# Patient Record
Sex: MALE | Race: WHITE
[De-identification: names, ages, dates, MRNs, and addresses within clinical notes are randomized per-mention and may not be internally consistent; named-entity substitution may affect disease eponyms.]

---

## 2018-09-12 ENCOUNTER — HOSPITAL ENCOUNTER (EMERGENCY)
Dept: HOSPITAL 97 - ER | Age: 38
LOS: 1 days | Discharge: HOME | End: 2018-09-13
Payer: COMMERCIAL

## 2018-09-12 DIAGNOSIS — Z71.41: ICD-10-CM

## 2018-09-12 DIAGNOSIS — F10.20: Primary | ICD-10-CM

## 2018-09-12 DIAGNOSIS — E78.00: ICD-10-CM

## 2018-09-12 DIAGNOSIS — E03.9: ICD-10-CM

## 2018-09-12 DIAGNOSIS — M79.601: ICD-10-CM

## 2018-09-12 LAB
ALBUMIN SERPL BCP-MCNC: 4.6 G/DL (ref 3.4–5)
ALP SERPL-CCNC: 115 U/L (ref 45–117)
ALT SERPL W P-5'-P-CCNC: 66 U/L (ref 12–78)
AST SERPL W P-5'-P-CCNC: 212 U/L (ref 15–37)
BUN BLD-MCNC: 13 MG/DL (ref 7–18)
GLUCOSE SERPLBLD-MCNC: 100 MG/DL (ref 74–106)
HCT VFR BLD CALC: 41.5 % (ref 39.6–49)
INR BLD: 1.08
LYMPHOCYTES # SPEC AUTO: 2.2 K/UL (ref 0.7–4.9)
MAGNESIUM SERPL-MCNC: 2.1 MG/DL (ref 1.8–2.4)
MCH RBC QN AUTO: 34.6 PG (ref 27–35)
MCV RBC: 101.9 FL (ref 80–100)
PMV BLD: 8.2 FL (ref 7.6–11.3)
POTASSIUM SERPL-SCNC: 3.1 MMOL/L (ref 3.5–5.1)
RBC # BLD: 4.07 M/UL (ref 4.33–5.43)

## 2018-09-12 PROCEDURE — 96366 THER/PROPH/DIAG IV INF ADDON: CPT

## 2018-09-12 PROCEDURE — 36415 COLL VENOUS BLD VENIPUNCTURE: CPT

## 2018-09-12 PROCEDURE — 80048 BASIC METABOLIC PNL TOTAL CA: CPT

## 2018-09-12 PROCEDURE — 81003 URINALYSIS AUTO W/O SCOPE: CPT

## 2018-09-12 PROCEDURE — 96365 THER/PROPH/DIAG IV INF INIT: CPT

## 2018-09-12 PROCEDURE — 85610 PROTHROMBIN TIME: CPT

## 2018-09-12 PROCEDURE — 83735 ASSAY OF MAGNESIUM: CPT

## 2018-09-12 PROCEDURE — 96375 TX/PRO/DX INJ NEW DRUG ADDON: CPT

## 2018-09-12 PROCEDURE — 80076 HEPATIC FUNCTION PANEL: CPT

## 2018-09-12 PROCEDURE — 82962 GLUCOSE BLOOD TEST: CPT

## 2018-09-12 PROCEDURE — 80307 DRUG TEST PRSMV CHEM ANLYZR: CPT

## 2018-09-12 PROCEDURE — 99284 EMERGENCY DEPT VISIT MOD MDM: CPT

## 2018-09-12 PROCEDURE — 85025 COMPLETE CBC W/AUTO DIFF WBC: CPT

## 2018-09-12 PROCEDURE — 93005 ELECTROCARDIOGRAM TRACING: CPT

## 2018-09-12 PROCEDURE — 80320 DRUG SCREEN QUANTALCOHOLS: CPT

## 2018-09-12 NOTE — EKG
Test Date:    2018-09-12               Test Time:    16:11:57

Technician:   CHUNG                                     

                                                     

MEASUREMENT RESULTS:                                       

Intervals:                                           

Rate:         96                                     

HI:           144                                    

QRSD:         98                                     

QT:           376                                    

QTc:          475                                    

Axis:                                                

P:            65                                     

HI:           144                                    

QRS:          -19                                    

T:            29                                     

                                                     

INTERPRETIVE STATEMENTS:                                       

                                                     

Normal sinus rhythm with sinus arrhythmia

Normal ECG

No previous ECG available for comparison



Electronically Signed On 09-12-18 19:09:40 CDT by Arie Schaefer

## 2018-09-12 NOTE — RAD REPORT
EXAM DESCRIPTION:  RAD - Humerus Right - 9/12/2018 5:22 pm

 

CLINICAL HISTORY:  PAIN

 

COMPARISON:  No comparisons

 

FINDINGS:  No fracture or dislocation is seen.

## 2018-09-13 LAB
METHAMPHET UR QL SCN: NEGATIVE
THC SERPL-MCNC: NEGATIVE NG/ML

## 2018-09-13 NOTE — EDPHYS
Physician Documentation                                                                           

 [facility]                                                                

Name: Javier Ryan                                                                                

Age: 38 yrs                                                                                       

Sex: Male                                                                                         

: 1980                                                                                   

MRN: [id number]                                                                                   

Arrival Date: 2018                                                                          

Time: 16:08                                                                                       

Account#: [id number]                                                                            

Bed 5                                                                                             

Private MD:                                                                                       

ED Physician Rosario Andrews                                                                    

HPI:                                                                                              

                                                                                             

19:40 This 38 yrs old  Male presents to ER via EMS with complaints of Alcohol        jr8 

      Withdrawal.                                                                                 

19:40 Patient came via EMS for alcohol withdrawal. Stated that last drink was early this      jr8 

      morning. Has on/off twitching. Fell about 5 days ago and hurt his arm. Wants to make        

      sure he did not break anything . Severity of symptoms: At their worst the symptoms were     

      mild in the emergency department the symptoms are unchanged. The patient has                

      experienced similar episodes in the past, chronically. The patient has not recently         

      seen a physician.                                                                           

                                                                                                  

Historical:                                                                                       

- Allergies:                                                                                      

16:11 No Known Allergies;                                                                     ss  

- Home Meds:                                                                                      

16:11 gemfibrozil 600 mg Oral tab 1 tab 2 times per day [Active]; Nature-Throid 97.5 mg Oral  ss  

      tab [Active];                                                                               

- PMHx:                                                                                           

16:11 High Cholesterol; Hypothyroidism; Pancreatitis;                                         ss  

- PSHx:                                                                                           

16:11 None;                                                                                   ss  

                                                                                                  

- Immunization history:: Adult Immunizations up to date.                                          

- Social history:: Smoking status: Patient/guardian denies using tobacco.                         

- Ebola Screening: : Patient denies exposure to infectious person Patient denies travel           

  to an Ebola-affected area in the 21 days before illness onset.                                  

                                                                                                  

                                                                                                  

ROS:                                                                                              

19:40 Eyes: Negative for injury, pain, redness, and discharge, ENT: Negative for injury,      jr8 

      pain, and discharge, Neck: Negative for injury, pain, and swelling, Cardiovascular:         

      Negative for chest pain, palpitations, and edema, Respiratory: Negative for shortness       

      of breath, cough, wheezing, and pleuritic chest pain, Abdomen/GI: Negative for              

      abdominal pain, nausea, vomiting, diarrhea, and constipation, Back: Negative for injury     

      and pain, Skin: Negative for injury, rash, and discoloration, Neuro: Negative for           

      headache, weakness, numbness, tingling, and seizure.                                        

19:40 MS/extremity: Positive for ecchymosis, pain, tenderness, of the right arm.                  

19:40 Psych: Positive for alcohol dependence.                                                     

                                                                                                  

Exam:                                                                                             

19:40 Eyes:  Pupils equal round and reactive to light, extra-ocular motions intact.  Lids and jr8 

      lashes normal.  Conjunctiva and sclera are non-icteric and not injected.  Cornea within     

      normal limits.  Periorbital areas with no swelling, redness, or edema. ENT:  Nares          

      patent. No nasal discharge, no septal abnormalities noted.  Tympanic membranes are          

      normal and external auditory canals are clear.  Oropharynx with no redness, swelling,       

      or masses, exudates, or evidence of obstruction, uvula midline.  Mucous membranes           

      moist. Neck:  Trachea midline, no thyromegaly or masses palpated, and no cervical           

      lymphadenopathy.  Supple, full range of motion without nuchal rigidity, or vertebral        

      point tenderness.  No Meningismus. Cardiovascular:  Regular rate and rhythm with a          

      normal S1 and S2.  No gallops, murmurs, or rubs.  Normal PMI, no JVD.  No pulse             

      deficits. Respiratory:  Lungs have equal breath sounds bilaterally, clear to                

      auscultation and percussion.  No rales, rhonchi or wheezes noted.  No increased work of     

      breathing, no retractions or nasal flaring. Abdomen/GI:  Soft, non-tender, with normal      

      bowel sounds.  No distension or tympany.  No guarding or rebound.  No evidence of           

      tenderness throughout. Skin:  Warm, dry with normal turgor.  Normal color with no           

      rashes, no lesions, and no evidence of cellulitis. Neuro:  Awake and alert, GCS 15,         

      oriented to person, place, time, and situation.  Cranial nerves II-XII grossly intact.      

      Motor strength 5/5 in all extremities.  Sensory grossly intact.  Cerebellar exam            

      normal.  Normal gait.                                                                       

19:40 Back: pain, is absent, ROM is normal, bruising to right lateral lower abdomen .             

19:40 Musculoskeletal/extremity: Extremities: grossly normal except: noted in the right arm:      

      bruising and hematoma to right arm. Tender to palpation, ROM: intact in all                 

      extremities, Circulation is intact in all extremities. Pulses: noted to be 2+ in the        

      right radial artery and left radial artery, Sensation intact.                               

                                                                                                  

Vital Signs:                                                                                      

16:11  / 100; Pulse 112; Resp 18; Temp 97.8(TE); Pulse Ox 98% on R/A; Weight 70.31 kg;  ss  

      Height 5 ft. 6 in. (167.64 cm); Pain 6/10;                                                  

17:40  / 97; Pulse 90; Resp 17; Pulse Ox 98% on R/A; Pain 6/10;                         sg  

18:38  / 95; Pulse 103; Resp 15; Pulse Ox 100% on R/A;                                  hb  

19:15  / 88; Pulse 110; Resp 20; Pulse Ox 95% on R/A; Pain 4/10;                        fc  

20:07  / 89; Pulse 97; Resp 20; Pulse Ox 97% on R/A; Pain 2/10;                         fc  

21:00  / 94; Pulse 103; Resp 20; Pulse Ox 98% on R/A;                                   fc  

21:50  / 94; Pulse 100; Resp 20; Pulse Ox 97% on R/A; Pain 3/10;                        fc  

23:00  / 89; Pulse 110; Resp 20; Pulse Ox 99% on R/A;                                   fc  

                                                                                             

00:00  / 86; Pulse 106; Resp 20; Pulse Ox 95% on R/A; Pain 3/10;                        fc  

01:00  / 81; Pulse 98; Resp 22; Pulse Ox 96% on R/A; Pain 2/10;                         fc  

02:00  / 77; Pulse 89; Resp 20; Temp 98.0(O); Pulse Ox 96% on R/A; Pain 2/10;           fc  

03:13  / 97; Pulse 92; Resp 24; Pulse Ox 94% on R/A;                                    ak1 

                                                                                             

16:11 Body Mass Index 25.02 (70.31 kg, 167.64 cm)                                             ss  

                                                                                                  

MDM:                                                                                              

                                                                                             

16:13 Patient medically screened.                                                             jr8 

21:08 Data reviewed: vital signs, nurses notes, lab test result(s), EKG, and as a result, I   jr8 

      will discharge patient. Data interpreted: Pulse oximetry: on room air is 97 %.              

      Interpretation: normal. Counseling: I had a detailed discussion with the patient and/or     

      guardian regarding: the historical points, exam findings, and any diagnostic results        

      supporting the discharge/admit diagnosis, lab results, the need for outpatient follow       

      up, a family practitioner, to return to the emergency department if symptoms worsen or      

      persist or if there are any questions or concerns that arise at home. ED course:            

      Discussed with patient that he needs to check into detox center. No acute withdrawal at     

      this time. Patient is good with this and will follow up. Patient wanting to quite           

      completely.                                                                                 

21:39 ED course: Patient without ride. Will hold until he finds one or becomes sober .        [facility] 

                                                                                                  

                                                                                             

16:20 Order name: Glucose, Ancillary Testing; Complete Time: 16:50                            EDMS

                                                                                             

16:22 Order name: Basic Metabolic Panel; Complete Time: 17:11                                 [facility] 

                                                                                             

16:22 Order name: CBC with Diff; Complete Time: 17:11                                         [facility] 

                                                                                             

16:22 Order name: ETOH Level; Complete Time: 17:11                                            [facility] 

                                                                                             

16:22 Order name: Hepatic Function; Complete Time: 17:11                                      [facility] 

                                                                                             

16:22 Order name: PT-INR; Complete Time: 17:11                                                [facility] 

                                                                                             

16:22 Order name: Urine Drug Screen; Complete Time: 02:08                                     [facility] 

                                                                                             

16:22 Order name: Magnesium; Complete Time: 17:11                                             [facility] 

                                                                                             

16:23 Order name: XRAY Humerus RIGHT; Complete Time: 17:45                                    [facility] 

                                                                                             

01:47 Order name: Urine Dipstick--Ancillary (enter results); Complete Time: 01:52             mt  

                                                                                             

16:22 Order name: EKG; Complete Time: 16:23                                                   [facility] 

                                                                                             

16:22 Order name: EKG - Nurse/Tech; Complete Time: 16:35                                      [facility] 

                                                                                             

16:22 Order name: IV Saline Lock; Complete Time: 16:35                                        [facility] 

                                                                                             

16:22 Order name: Labs collected and sent; Complete Time: 16:36                               [facility] 

                                                                                             

16:22 Order name: Urine Dipstick-Ancillary (obtain specimen); Complete Time: 01:52            [facility] 

                                                                                             

17:42 Order name: Diet Regular; Complete Time: 17:42                                          sg  

                                                                                                  

Administered Medications:                                                                         

16:50 Drug: NS 0.9% 1000 ml Route: IV; Rate: 1000 ml; Site: left antecubital;                 sg  

19:39 Follow up: Response: No adverse reaction; No change in condition; IV Status: Completed  fc  

      infusion; IV Intake: 1000ml                                                                 

16:54 Drug: Ativan 1 mg Route: IVP; Site: left antecubital;                                   sg  

17:58 Follow up: Response: No adverse reaction                                                sg  

16:55 Drug: Banana Bag - (NS 0.9% 1000 ml, foLIC Acid 1 mg, Thiamine 100 mg, Multivitamin 1   sg  

      amp) Route: IV; Rate: calculated rate; Site: left antecubital;                              

21:00 Follow up: Response: No adverse reaction; No change in condition; IV Status: Completed    

      infusion; IV Intake: 1000ml                                                                 

18:39 Drug: Potassium Chloride 40 mEq Route: PO;                                              hb  

19:39 Follow up: Response: No adverse reaction; No change in condition                          

21:30 Drug: Librium - chlordiazePOXIDE 50 mg Route: PO;                                         

22:30 Follow up: Response: No adverse reaction; Anxiety decreased                               

21:47 Drug: Ativan 2 mg Route: IVP; Site: left antecubital;                                     

22:30 Follow up: Response: No adverse reaction; Anxiety decreased                               

21:48 Drug: NS 0.9% 1000 ml Route: IV; Rate: 100 ml/hr; Site: left antecubital;                 

                                                                                             

01:58 Drug: Ativan 2 mg Route: IVP; Site: left antecubital;                                     

                                                                                                  

                                                                                                  

Disposition:                                                                                      

18 03:21 Discharged to Home. Impression: Alcohol abuse, Alcohol abuse counseling and        

  surveillance.                                                                                   

- Condition is Stable.                                                                            

- Discharge Instructions: Alcohol Intoxication.                                                   

                                                                                                  

- Medication Reconciliation Form, Thank You Letter, Antibiotic Education, Prescription            

  Opioid Use form.                                                                                

- Follow up: Private Physician; When: 2 - 3 days; Reason: Recheck today's complaints,             

  Continuance of care, Re-evaluation by your physician.                                           

- Problem is new.                                                                                 

- Symptoms have improved.                                                                         

                                                                                                  

                                                                                                  

                                                                                                  

Addendum:                                                                                         

2018                                                                                        

     18:21 Co-signature as Attending Physician, Rosario Andrews MD.                               m
a2

                                                                                                  

Signatures:                                                                                       

Dispatcher [employer]                           EDMS                                                 

Morgan Santos RN RN sg Chretien, Felicia, RN RN                                                      

Karen Harper RN RN ss Roszak, Josh, PA                        PA   jr8                                                  

Rowena Avina RN RN hb Thompson, Moriah mt Alzahri, Mohammad, MD MD   ma2                                                  

                                                                                                  

Corrections: (The following items were deleted from the chart)                                    

                                                                                             

21:13 21:08 ED course: Discussed with patient that he needs to check into detox center. No    jr8 

      acute withdrawal at this time. Patient is good with this and will follow up . jr8           

21:39 21:09 2018 21:09 Discharged to Home. Impression: Alcohol abuse; Alcohol abuse     jr8 

      counseling and surveillance. Condition is Stable. Forms are Medication Reconciliation       

      Form, Thank You Letter, Antibiotic Education, Prescription Opioid Use. Follow up:           

      Private Physician; When: 2 - 3 days; Reason: Recheck today's complaints, Continuance of     

      care, Re-evaluation by your physician. Problem is new. Symptoms have improved. jr8          

                                                                                             

07:29 03:21 2018 03:21 Discharged to Home. Impression: Alcohol abuse; Alcohol abuse     mt  

      counseling and surveillance. Condition is Stable. Forms are Medication Reconciliation       

      Form, Thank You Letter, Antibiotic Education, Prescription Opioid Use. Follow up:           

      Private Physician; When: 2 - 3 days; Reason: Recheck today's complaints, Continuance of     

      care, Re-evaluation by your physician. Problem is new. Symptoms have improved. jr8          

                                                                                                  

**************************************************************************************************

## 2018-09-13 NOTE — ER
Nurse's Notes                                                                                     

 [facility]                                                                

Name: Javier Ryan                                                                                

Age: 38 yrs                                                                                       

Sex: Male                                                                                         

: 1980                                                                                   

MRN: [id number]                                                                                   

Arrival Date: 2018                                                                          

Time: 16:08                                                                                       

Account#: [id number]                                                                            

Bed 5                                                                                             

Private MD:                                                                                       

Diagnosis: Alcohol abuse;Alcohol abuse counseling and surveillance                                

                                                                                                  

Presentation:                                                                                     

                                                                                             

16:09 Presenting complaint: EMS states: Pt is withdrawing from ETOH. Last drink was this AM   ss  

      at 0200, and patient attempted to go to rehab facility in [place], but was turned away     

      and told to come to ER for initial evaluation. Transition of care: patient was not          

      received from another setting of care. Onset of symptoms was 2018. Risk       

      Assessment: Do you want to hurt yourself or someone else? Patient reports no desire to      

      harm self or others. Initial Sepsis Screen: Does the patient meet any 2 criteria? HR >      

      90 bpm. Does the patient have a suspected source of infection? No. Patient's initial        

      sepsis screen is negative. Care prior to arrival: None.                                     

16:09 Method Of Arrival: EMS: [facility]                                                       ss  

16:09 Acuity: ROSANNE 2                                                                           ss  

                                                                                                  

Historical:                                                                                       

- Allergies:                                                                                      

16:11 No Known Allergies;                                                                     ss  

- Home Meds:                                                                                      

16:11 gemfibrozil 600 mg Oral tab 1 tab 2 times per day [Active]; Nature-Throid 97.5 mg Oral  ss  

      tab [Active];                                                                               

- PMHx:                                                                                           

16:11 High Cholesterol; Hypothyroidism; Pancreatitis;                                         ss  

- PSHx:                                                                                           

16:11 None;                                                                                   ss  

                                                                                                  

- Immunization history:: Adult Immunizations up to date.                                          

- Social history:: Smoking status: Patient/guardian denies using tobacco.                         

- Ebola Screening: : Patient denies exposure to infectious person Patient denies travel           

  to an Ebola-affected area in the 21 days before illness onset.                                  

                                                                                                  

                                                                                                  

Screenin:00 Abuse screen: Denies threats or abuse. Denies injuries from another. Nutritional        hb  

      screening: No deficits noted. Tuberculosis screening: No symptoms or risk factors           

      identified. Fall Risk Total Vivar Fall Scale indicates High Risk Score (45 or more          

      points). Fall prevention measures have been instituted. Side Rails Up X 2 Frequent          

      Obs/Assessments Occuring As available patient and family educated on Fall Prevention        

      Program and Strategies.                                                                     

                                                                                                  

Assessment:                                                                                       

16:15 General: Appears in no apparent distress. Behavior is cooperative, restless, Smells of  hb  

      alcohol. Pain: Denies pain. Neuro: Level of Consciousness is obeys commands, lethargic,     

      Oriented to person, place, time, situation, Pupils are PERRLA. Cardiovascular: Heart        

      tones S1 S2 present Capillary refill < 3 seconds Patient's skin is warm and dry.            

      Respiratory: Airway is patent Respiratory effort is even, unlabored, Respiratory            

      pattern is regular, symmetrical, Breath sounds are clear bilaterally. GI: No signs          

      and/or symptoms were reported involving the gastrointestinal system. : No signs           

      and/or symptoms were reported regarding the genitourinary system. EENT: No signs and/or     

      symptoms were reported regarding the EENT system. Derm: Skin is intact, is healthy with     

      good turgor, bruising noted to right arm and right hip. Musculoskeletal: No signs           

      and/or symptoms reported regarding the musculoskeletal system.                              

17:15 Reassessment: Patient appears in no apparent distress at this time. No changes from       

      previously documented assessment. Patient and/or family updated on plan of care and         

      expected duration. Pain level reassessed.                                                   

18:15 Reassessment: Patient appears in no apparent distress at this time. No changes from       

      previously documented assessment. Patient and/or family updated on plan of care and         

      expected duration. Pain level reassessed. Admission ordered, awaiting room assignment       

      at this time.                                                                               

19:15 General: Appears in no apparent distress. comfortable, Behavior is calm, cooperative,   fc  

      appropriate for age, Smells of alcohol. Pain: Complains of pain in right arm. Neuro:        

      Level of Consciousness is awake, alert, obeys commands, Oriented to person, place,          

      time, situation,  are equal bilaterally Moves all extremities. Full function           

      Speech is normal, Facial symmetry appears normal. Cardiovascular: Heart tones S1 S2         

      Capillary refill < 3 seconds Patient's skin is warm and dry. Respiratory: Airway is         

      patent Respiratory effort is even, unlabored, Respiratory pattern is regular,               

      symmetrical, Breath sounds are clear bilaterally. GI: No deficits noted. : No             

      deficits noted. EENT: No deficits noted. Derm: Skin is pink, warm \T\ dry. Bruising that    

      is dark purple, green, yellow, on right arm. Musculoskeletal: Circulation, motion, and      

      sensation intact. Capillary refill < 3 seconds, Range of motion: intact in all              

      extremities.                                                                                

20:06 Reassessment: No changes from previously documented assessment. Patient and/or family   fc  

      updated on plan of care and expected duration. Pain level reassessed. Patient is alert,     

      oriented x 3, equal unlabored respirations, skin warm/dry/pink. Chano HENLEY in to discuss       

      discharge with pt once fluids are complete.                                                 

20:58 Reassessment: No changes from previously documented assessment. Patient and/or family   fc  

      updated on plan of care and expected duration. Pain level reassessed. Patient is alert,     

      oriented x 3, equal unlabored respirations, skin warm/dry/pink. Pt continues to await       

      fluid completion. No change in overall condition.                                           

21:35 Reassessment: Pt continues to shake on his right arm. States that he cannot control it. fc  

      Discussed with Chano HENLEY about that and that pt has no ride home at this time. He ordered     

      Ativan and NS and will hold discharge until ride is available.                              

22:31 Reassessment: shaking to right arm has stopped.                                         fc  

22:58 Reassessment: No changes from previously documented assessment. Patient and/or family   fc  

      updated on plan of care and expected duration. Pain level reassessed. Patient is alert,     

      oriented x 3, equal unlabored respirations, skin warm/dry/pink. Pt requesting to get up     

      and walk around. Explained that he was just given ATivan and that he could not get up       

      and needed to just rest at this time. Pt states that he understands.                        

23:19 Reassessment: Pt given sandwich to eat and is now requesting sleeping pill. Explained   fc  

      that we would discuss it with provider.                                                     

                                                                                             

00:00 Reassessment: No changes from previously documented assessment. Patient and/or family   fc  

      updated on plan of care and expected duration. Pain level reassessed. Patient is alert,     

      oriented x 3, equal unlabored respirations, skin warm/dry/pink. Pt is resting quietly       

      in bed.                                                                                     

01:00 Reassessment: No changes from previously documented assessment. Patient and/or family   fc  

      updated on plan of care and expected duration. Pain level reassessed. Patient is alert,     

      oriented x 3, equal unlabored respirations, skin warm/dry/pink. Pt pending movement to      

      another room so that he can rest in a quieter place.                                        

01:46 Reassessment: No changes from previously documented assessment. Patient and/or family   fc  

      updated on plan of care and expected duration. Pain level reassessed. Patient is alert,     

      oriented x 3, equal unlabored respirations, skin warm/dry/pink. Pt moved to ER bed 5        

      and is requesting Ativan. Will discuss with provider again.                                 

01:51 Reassessment: After speaking with Chano HENLEY pt will get Ativan 2 mg ivp x 1.                

03:13 Reassessment: Patient appears in no apparent distress at this time. pt used bedside     ak1 

      commode and is back on stretcher and resting. will continue to monitor.                     

06:37 Reassessment: pt ambulated with slightly unsteady gait, drank some water, is attempting bb  

      to call for a ride home.                                                                    

06:49 Reassessment: pt states he has called his neighbor who will come pick him up after she  bb  

      drops her daughter off at school in [facility].                                               

                                                                                                  

Vital Signs:                                                                                      

                                                                                             

16:11  / 100; Pulse 112; Resp 18; Temp 97.8(TE); Pulse Ox 98% on R/A; Weight 70.31 kg;  ss  

      Height 5 ft. 6 in. (167.64 cm); Pain 6/10;                                                  

17:40  / 97; Pulse 90; Resp 17; Pulse Ox 98% on R/A; Pain 6/10;                         sg  

18:38  / 95; Pulse 103; Resp 15; Pulse Ox 100% on R/A;                                  hb  

19:15  / 88; Pulse 110; Resp 20; Pulse Ox 95% on R/A; Pain 4/10;                        fc  

20:07  / 89; Pulse 97; Resp 20; Pulse Ox 97% on R/A; Pain 2/10;                         fc  

21:00  / 94; Pulse 103; Resp 20; Pulse Ox 98% on R/A;                                   fc  

21:50  / 94; Pulse 100; Resp 20; Pulse Ox 97% on R/A; Pain 3/10;                        fc  

23:00  / 89; Pulse 110; Resp 20; Pulse Ox 99% on R/A;                                   fc  

                                                                                             

00:00  / 86; Pulse 106; Resp 20; Pulse Ox 95% on R/A; Pain 3/10;                        fc  

01:00  / 81; Pulse 98; Resp 22; Pulse Ox 96% on R/A; Pain 2/10;                         fc  

02:00  / 77; Pulse 89; Resp 20; Temp 98.0(O); Pulse Ox 96% on R/A; Pain 2/10;           fc  

03:13  / 97; Pulse 92; Resp 24; Pulse Ox 94% on R/A;                                    ak1 

                                                                                             

16:11 Body Mass Index 25.02 (70.31 kg, 167.64 cm)                                               

                                                                                                  

ED Course:                                                                                        

                                                                                             

16:08 Patient arrived in ED.                                                                  ss  

16:10 Triage completed.                                                                       ss  

16:11 Arm band placed on right wrist.                                                         ss  

16:13 Chano Fox PA is PHCP.                                                               [id number] 

16:13 Rosario Andrews MD is Attending Physician.                                           [id number] 

16:15 EKG done, by EKG tech. reviewed by Chano HENLEY.                                      3 

16:54 Morgan Santos, RN is Primary Nurse.                                                       sg  

17:07 X-ray completed. Portable x-ray completed in exam room. Patient tolerated procedure     bb2 

      well.                                                                                       

17:08 XRAY Humerus RIGHT In Process Unspecified.                                              EDMS

19:00 Patient has correct armband on for positive identification. Bed in low position. Call   fc  

      light in reach. Side rails up X2. Seizure precautions initiated.                            

19:00 Inserted saline lock: 20 gauge in left antecubital area, using aseptic technique.       fc  

      ,using aseptic technique. in place upon my arrival to care for pt.                          

20:18 Primary Nurse role handed off by Morgan Santos, RN                                        rg2 

                                                                                             

02:03 No provider procedures requiring assistance completed.                                  fc  

03:11 Sara De Santiago, RN is Primary Nurse.                                                     ak1 

07:25 IV discontinued, intact, bleeding controlled, No redness/swelling at site. Pressure     sg  

      dressing applied.                                                                           

                                                                                                  

Administered Medications:                                                                         

                                                                                             

16:50 Drug: NS 0.9% 1000 ml Route: IV; Rate: 1000 ml; Site: left antecubital;                 sg  

19:39 Follow up: Response: No adverse reaction; No change in condition; IV Status: Completed  fc  

      infusion; IV Intake: 1000ml                                                                 

16:54 Drug: Ativan 1 mg Route: IVP; Site: left antecubital;                                   sg  

17:58 Follow up: Response: No adverse reaction                                                sg  

16:55 Drug: Banana Bag - (NS 0.9% 1000 ml, foLIC Acid 1 mg, Thiamine 100 mg, Multivitamin 1   sg  

      amp) Route: IV; Rate: calculated rate; Site: left antecubital;                              

21:00 Follow up: Response: No adverse reaction; No change in condition; IV Status: Completed  fc  

      infusion; IV Intake: 1000ml                                                                 

18:39 Drug: Potassium Chloride 40 mEq Route: PO;                                              hb  

19:39 Follow up: Response: No adverse reaction; No change in condition                        fc  

21:30 Drug: Librium - chlordiazePOXIDE 50 mg Route: PO;                                       fc  

22:30 Follow up: Response: No adverse reaction; Anxiety decreased                             fc  

21:47 Drug: Ativan 2 mg Route: IVP; Site: left antecubital;                                     

22:30 Follow up: Response: No adverse reaction; Anxiety decreased                             fc  

21:48 Drug: NS 0.9% 1000 ml Route: IV; Rate: 100 ml/hr; Site: left antecubital;                 

                                                                                             

01:58 Drug: Ativan 2 mg Route: IVP; Site: left antecubital;                                     

                                                                                                  

                                                                                                  

Intake:                                                                                           

                                                                                             

19:39 IV: 1000ml; Total: 1000ml.                                                              fc  

21:00 IV: 1000ml; Total: 2000ml.                                                                

                                                                                                  

Outcome:                                                                                          

21:09 Discharge ordered by MD. pfeiffer 

                                                                                             

03:21 Discharge ordered by MD. pfeiffer 

07:25 Discharged to home ambulatory, with friend.                                               

07:25 Condition: stable                                                                           

07:25 Instructed on discharge instructions, follow up and referral plans. safety practices,       

      quitting drinking Demonstrated understanding of instructions, follow-up care, pt            

      provided with outpatient/inpatient drug/alcohol rehabilitation options in the               

      surrounding areas and [facility] area                                                  

07:29 Patient left the ED.                                                                    mt  

                                                                                                  

Signatures:                                                                                       

Dispatcher [employer]                           EDKacy Pulido                               2                                                  

Morgan Santos RN RN                                                      

Blanche Poe RN RN                                                      

Lindy Rhodes RN RN bb Smirch, Shelby, RN RN ss Roszak, Josh, PA PA   [facility]                                                  

Sara De Santiago RN RN ak1 Baxter, Heather, RN RN hb Thompson, Moriah                             mt                                                   

Shagufta Dudley                               2                                                  

Altagracia Bautista                              3                                                  

                                                                                                  

**************************************************************************************************